# Patient Record
Sex: FEMALE | NOT HISPANIC OR LATINO | URBAN - METROPOLITAN AREA
[De-identification: names, ages, dates, MRNs, and addresses within clinical notes are randomized per-mention and may not be internally consistent; named-entity substitution may affect disease eponyms.]

---

## 2021-11-15 ENCOUNTER — EMERGENCY (EMERGENCY)
Facility: HOSPITAL | Age: 31
LOS: 1 days | Discharge: HOME | End: 2021-11-15
Admitting: EMERGENCY MEDICINE
Payer: COMMERCIAL

## 2021-11-15 VITALS
HEIGHT: 65 IN | RESPIRATION RATE: 16 BRPM | OXYGEN SATURATION: 100 % | WEIGHT: 125 LBS | TEMPERATURE: 98 F | DIASTOLIC BLOOD PRESSURE: 87 MMHG | HEART RATE: 111 BPM | SYSTOLIC BLOOD PRESSURE: 142 MMHG

## 2021-11-15 DIAGNOSIS — S60.221A CONTUSION OF RIGHT HAND, INITIAL ENCOUNTER: ICD-10-CM
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
## 2021-11-15 DIAGNOSIS — S69.92XA UNSPECIFIED INJURY OF LEFT WRIST, HAND AND FINGER(S), INITIAL ENCOUNTER: ICD-10-CM
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
## 2021-11-15 PROCEDURE — 99283 EMERGENCY DEPT VISIT LOW MDM: CPT | Mod: 25

## 2021-11-15 PROCEDURE — 73130 X-RAY EXAM OF HAND: CPT

## 2021-11-15 PROCEDURE — 73130 X-RAY EXAM OF HAND: CPT | Mod: 26,LT

## 2021-11-15 PROCEDURE — 99283 EMERGENCY DEPT VISIT LOW MDM: CPT

## 2021-11-15 NOTE — ED PROVIDER NOTE - NSFOLLOWUPINSTRUCTIONS_ED_ALL_ED_FT
While your xray did not show any obvious bony deformity, the area of your tenderness and bruising is concerning for an occult scaphoid fracture. This is treated with a thumb spica splint. Please wear splint until you are re-evaluated by your primary care doctor or a hand specialist.     Take tylenol 650mg up to four times daily for pain.    Elevate hand and apply ice packs at home.    Follow up within 1 week. See orthopedic hand information below. If you have any difficulty obtaining an appointment, please call our Referrals Coordinator at [phone].

## 2021-11-15 NOTE — ED PROVIDER NOTE - PHYSICAL EXAMINATION
VITAL SIGNS: I have reviewed nursing notes and confirm.  CONSTITUTIONAL: Well-developed; in no acute distress.   SKIN:  warm and dry, no acute rash.   HEAD:  normocephalic, atraumatic.  EYES: PERRL, EOM intact; conjunctiva and sclera clear.  ENT: No nasal discharge; airway clear.   NECK: Supple; non tender.  CARD: S1, S2 normal; no murmurs, gallops, or rubs. Regular rate and rhythm.   RESP:  Clear to auscultation b/l, no wheezes, rales or rhonchi.  ABD: Normal bowel sounds; soft; non-distended; non-tender; no guarding/ rebound.  EXT: Normal ROM. No clubbing, cyanosis or edema. 2+ pulses to b/l ue/le.  NEURO: Alert, oriented, grossly unremarkable  PSYCH: Cooperative, mood and affect appropriate.    L Hand: non-ttp wrist with FROM. she has ecchymosis over thenar eminence and 1st mcp with snuffbox tenderness and ttp over 1st mcp joint. ROM at mcp limited by pain. she has FROM at IP joint. distal sensation intact. cap refill <2 sec. sensation and strength intact in median, ulnar and radial nerve distributions.

## 2021-11-15 NOTE — ED ADULT TRIAGE NOTE - INTERNATIONAL TRAVEL
--Colonoscopy on June 6, as the last colonoscopy, with split Golytely prep (with Dulcolax 4 tablets and 4 liters Golytely) and one bottle Magnesium Citrate extra the night before  --OK to do with IV sedation, to be the last case on that Tuesday (13:45).   --Thanks   No

## 2021-11-15 NOTE — ED PROVIDER NOTE - OBJECTIVE STATEMENT
32yo RHD female with no reported pmhx presents with left thumb injury 2 nights ago. Pt states she slipped while at a bar and fell with her left thumb bent back. She reports pain and swelling since that time. She was placed in a makeshift splint by the  which she has left in place. She denies other injury or trauma. She denies numbness or weakness of the thumb.

## 2021-11-15 NOTE — ED ADULT TRIAGE NOTE - CHIEF COMPLAINT QUOTE
Patient presents to ED w/ c/o of L thumb pain/swelling, bruising s/p fall on Saturday night. Limited ROM.

## 2021-11-15 NOTE — ED PROVIDER NOTE - CARE PROVIDER_API CALL
Collins Aranda)  Orthopaedic Surgery  [address], [address] [place] [address]  Phone: [phone]  Fax: [phone]  Follow Up Time:

## 2021-11-15 NOTE — ED PROVIDER NOTE - CLINICAL SUMMARY MEDICAL DECISION MAKING FREE TEXT BOX
Pt hemodynamically stable. SHe has injury to left thumb sustained 2 nights ago. Pain and ecchymosis over thenar eminence and 1st mcp. NO obvious bony injury on xray; however will place in thumb spica and refer to hand for follow up in 1 week for re-evaluation for occult scaphoid injury. Discussed with pt. REturn precautions given.
